# Patient Record
Sex: FEMALE | Race: WHITE | NOT HISPANIC OR LATINO | Employment: FULL TIME | URBAN - METROPOLITAN AREA
[De-identification: names, ages, dates, MRNs, and addresses within clinical notes are randomized per-mention and may not be internally consistent; named-entity substitution may affect disease eponyms.]

---

## 2023-11-09 ENCOUNTER — LAB (OUTPATIENT)
Dept: LAB | Facility: HOSPITAL | Age: 38
End: 2023-11-09
Payer: COMMERCIAL

## 2023-11-09 ENCOUNTER — OFFICE VISIT (OUTPATIENT)
Dept: FAMILY MEDICINE CLINIC | Age: 38
End: 2023-11-09
Payer: COMMERCIAL

## 2023-11-09 VITALS
HEIGHT: 58 IN | BODY MASS INDEX: 42.19 KG/M2 | SYSTOLIC BLOOD PRESSURE: 114 MMHG | WEIGHT: 201 LBS | HEART RATE: 96 BPM | DIASTOLIC BLOOD PRESSURE: 79 MMHG

## 2023-11-09 DIAGNOSIS — Z87.42 HISTORY OF ABNORMAL CERVICAL PAP SMEAR: ICD-10-CM

## 2023-11-09 DIAGNOSIS — Z00.00 ENCOUNTER FOR MEDICAL EXAMINATION TO ESTABLISH CARE: Primary | ICD-10-CM

## 2023-11-09 DIAGNOSIS — Z23 INFLUENZA VACCINE ADMINISTERED: ICD-10-CM

## 2023-11-09 DIAGNOSIS — E55.9 VITAMIN D DEFICIENCY: ICD-10-CM

## 2023-11-09 DIAGNOSIS — F41.9 ANXIETY AND DEPRESSION: ICD-10-CM

## 2023-11-09 DIAGNOSIS — F32.A ANXIETY AND DEPRESSION: ICD-10-CM

## 2023-11-09 DIAGNOSIS — E66.01 MORBID OBESITY WITH BMI OF 40.0-44.9, ADULT: ICD-10-CM

## 2023-11-09 DIAGNOSIS — K44.9 HIATAL HERNIA: ICD-10-CM

## 2023-11-09 DIAGNOSIS — F43.10 PTSD (POST-TRAUMATIC STRESS DISORDER): ICD-10-CM

## 2023-11-09 DIAGNOSIS — E78.2 MIXED HYPERLIPIDEMIA: ICD-10-CM

## 2023-11-09 DIAGNOSIS — K21.9 GASTROESOPHAGEAL REFLUX DISEASE, UNSPECIFIED WHETHER ESOPHAGITIS PRESENT: ICD-10-CM

## 2023-11-09 DIAGNOSIS — K58.0 IRRITABLE BOWEL SYNDROME WITH DIARRHEA: ICD-10-CM

## 2023-11-09 LAB
ALBUMIN SERPL-MCNC: 4.4 G/DL (ref 3.5–5.2)
ALBUMIN/GLOB SERPL: 1.4 G/DL
ALP SERPL-CCNC: 68 U/L (ref 39–117)
ALT SERPL W P-5'-P-CCNC: 29 U/L (ref 1–33)
ANION GAP SERPL CALCULATED.3IONS-SCNC: 11.1 MMOL/L (ref 5–15)
AST SERPL-CCNC: 20 U/L (ref 1–32)
BILIRUB SERPL-MCNC: 0.5 MG/DL (ref 0–1.2)
BUN SERPL-MCNC: 10 MG/DL (ref 6–20)
BUN/CREAT SERPL: 10.8 (ref 7–25)
CALCIUM SPEC-SCNC: 9.5 MG/DL (ref 8.6–10.5)
CHLORIDE SERPL-SCNC: 103 MMOL/L (ref 98–107)
CHOLEST SERPL-MCNC: 275 MG/DL (ref 0–200)
CO2 SERPL-SCNC: 24.9 MMOL/L (ref 22–29)
CREAT SERPL-MCNC: 0.93 MG/DL (ref 0.57–1)
EGFRCR SERPLBLD CKD-EPI 2021: 80.8 ML/MIN/1.73
GLOBULIN UR ELPH-MCNC: 3.1 GM/DL
GLUCOSE SERPL-MCNC: 118 MG/DL (ref 65–99)
HDLC SERPL-MCNC: 43 MG/DL (ref 40–60)
LDLC SERPL CALC-MCNC: 186 MG/DL (ref 0–100)
LDLC/HDLC SERPL: 4.28 {RATIO}
POTASSIUM SERPL-SCNC: 3.6 MMOL/L (ref 3.5–5.2)
PROT SERPL-MCNC: 7.5 G/DL (ref 6–8.5)
SODIUM SERPL-SCNC: 139 MMOL/L (ref 136–145)
TRIGL SERPL-MCNC: 239 MG/DL (ref 0–150)
VLDLC SERPL-MCNC: 46 MG/DL (ref 5–40)

## 2023-11-09 PROCEDURE — 36415 COLL VENOUS BLD VENIPUNCTURE: CPT

## 2023-11-09 PROCEDURE — 80053 COMPREHEN METABOLIC PANEL: CPT

## 2023-11-09 PROCEDURE — 80061 LIPID PANEL: CPT

## 2023-11-09 PROCEDURE — 82306 VITAMIN D 25 HYDROXY: CPT

## 2023-11-09 RX ORDER — DICYCLOMINE HYDROCHLORIDE 10 MG/1
10 CAPSULE ORAL 2 TIMES DAILY
COMMUNITY

## 2023-11-09 RX ORDER — SERTRALINE HYDROCHLORIDE 25 MG/1
25 TABLET, FILM COATED ORAL DAILY
Qty: 30 TABLET | Refills: 0 | Status: SHIPPED | OUTPATIENT
Start: 2023-11-09

## 2023-11-09 RX ORDER — ROSUVASTATIN CALCIUM 20 MG/1
20 TABLET, COATED ORAL DAILY
COMMUNITY
Start: 2023-07-31 | End: 2023-11-13

## 2023-11-09 RX ORDER — ERGOCALCIFEROL 1.25 MG/1
50000 CAPSULE ORAL
COMMUNITY
End: 2023-11-10 | Stop reason: SDUPTHER
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
## 2023-11-10 ENCOUNTER — PATIENT MESSAGE (OUTPATIENT)
Dept: FAMILY MEDICINE CLINIC | Age: 38
End: 2023-11-10

## 2023-11-10 LAB — 25(OH)D3 SERPL-MCNC: 30.7 NG/ML (ref 30–100)

## 2023-11-10 RX ORDER — ERGOCALCIFEROL 1.25 MG/1
50000 CAPSULE ORAL
Qty: 4 CAPSULE | Refills: 3 | Status: SHIPPED | OUTPATIENT
Start: 2023-11-10

## 2023-11-10 NOTE — ASSESSMENT & PLAN NOTE
Patient's (Body mass index is 42.01 kg/m².) indicates that they are morbidly/severely obese (BMI > 40 or > 35 with obesity - related health condition) with health conditions that include dyslipidemias and GERD . Weight is unchanged. BMI  is above average; BMI management plan is completed. We discussed portion control, increasing exercise, and consulting a Bariatric surgeon.

## 2023-11-10 NOTE — ASSESSMENT & PLAN NOTE
Not controlled, pt would like to start zoloft. Resuming benzo not recommended, will be referred to psych for further workup and management given pt reported possible hx of schizophrenia

## 2023-11-13 RX ORDER — ROSUVASTATIN CALCIUM 40 MG/1
40 TABLET, COATED ORAL NIGHTLY
Qty: 90 TABLET | Refills: 0 | Status: SHIPPED | OUTPATIENT
Start: 2023-11-13

## 2023-11-28 ENCOUNTER — TELEPHONE (OUTPATIENT)
Dept: FAMILY MEDICINE CLINIC | Age: 38
End: 2023-11-28
Payer: COMMERCIAL

## 2023-12-10 DIAGNOSIS — F32.A ANXIETY AND DEPRESSION: ICD-10-CM

## 2023-12-10 DIAGNOSIS — F41.9 ANXIETY AND DEPRESSION: ICD-10-CM

## 2023-12-11 ENCOUNTER — OFFICE VISIT (OUTPATIENT)
Dept: FAMILY MEDICINE CLINIC | Age: 38
End: 2023-12-11
Payer: COMMERCIAL

## 2023-12-11 VITALS
WEIGHT: 207.4 LBS | BODY MASS INDEX: 43.53 KG/M2 | SYSTOLIC BLOOD PRESSURE: 125 MMHG | HEIGHT: 58 IN | DIASTOLIC BLOOD PRESSURE: 80 MMHG | HEART RATE: 95 BPM

## 2023-12-11 DIAGNOSIS — Z01.419 WELL WOMAN EXAM WITH ROUTINE GYNECOLOGICAL EXAM: Primary | ICD-10-CM

## 2023-12-11 DIAGNOSIS — F32.A ANXIETY AND DEPRESSION: ICD-10-CM

## 2023-12-11 DIAGNOSIS — Z12.4 ENCOUNTER FOR PAPANICOLAOU SMEAR FOR CERVICAL CANCER SCREENING: ICD-10-CM

## 2023-12-11 DIAGNOSIS — R32 URINARY INCONTINENCE, UNSPECIFIED TYPE: ICD-10-CM

## 2023-12-11 DIAGNOSIS — F41.9 ANXIETY AND DEPRESSION: ICD-10-CM

## 2023-12-11 PROCEDURE — G0123 SCREEN CERV/VAG THIN LAYER: HCPCS | Performed by: NURSE PRACTITIONER

## 2023-12-11 PROCEDURE — 87624 HPV HI-RISK TYP POOLED RSLT: CPT | Performed by: NURSE PRACTITIONER

## 2023-12-11 RX ORDER — SERTRALINE HYDROCHLORIDE 25 MG/1
25 TABLET, FILM COATED ORAL DAILY
Qty: 30 TABLET | Refills: 0 | OUTPATIENT
Start: 2023-12-11

## 2023-12-11 RX ORDER — OXYBUTYNIN CHLORIDE 5 MG/1
5 TABLET, EXTENDED RELEASE ORAL DAILY
Qty: 90 TABLET | Refills: 0 | Status: SHIPPED | OUTPATIENT
Start: 2023-12-11

## 2023-12-11 NOTE — PROGRESS NOTES
"Emily Garcia presents to [facility] with complaint of  Gynecologic Exam    SUBJECTIVE  History of Present Illness    WELL WOMAN EXAM:  Her last well woman  exam was  8 year(s) ago.  Her last menstrual period was  1 week ago .  Her current method of contraception is  tubal .  Sexually active?  Spouse.    Last pap smear was done  8 yrs ago .  Her last pap smear was normal.   She performs self breast exams NEVER.  Denies any concerns with findings on exam.     Patient was also started on Zoloft at her last visit.  She does this medication has been somewhat helpful.  She does feel like the medication needs to be stronger.  She was also referred to psychiatry due to history of PTSD.  Patient was not able to be reached for appointment scheduling.    Patient is also wanting to discuss bladder leakage.  Patient has had 6 children and has been told in the past that her bladder leakage is related to this.  She says that her urinary leakage does not occur with sneezing, coughing, jumping laughing etc.  Patient says that she will be sitting at her desk at work and will stand up and will have a puddle of urine in her chair.  This has become very embarrassing and frustrating for the patient.  She is asking what can be done to help with this.        OBJECTIVE  Vital Signs:   /80 (BP Location: Left arm, Patient Position: Sitting)   Pulse 95   Ht 147.3 cm (58\")   Wt 94.1 kg (207 lb 6.4 oz)   BMI 43.35 kg/m²       Physical Exam  Vitals reviewed. Exam conducted with a chaperone present.   Constitutional:       General: She is not in acute distress.     Appearance: Normal appearance. She is not ill-appearing.   HENT:      Head: Normocephalic and atraumatic.      Nose: Nose normal.      Mouth/Throat:      Mouth: Mucous membranes are moist.      Pharynx: Oropharynx is clear.   Cardiovascular:      Rate and Rhythm: Normal rate and regular rhythm.      Pulses: Normal pulses.      Heart sounds: " Normal heart sounds.   Pulmonary:      Effort: Pulmonary effort is normal.      Breath sounds: Normal breath sounds.   Chest:      Chest wall: No mass or deformity.   Breasts:     Breasts are symmetrical.      Right: No swelling, bleeding, inverted nipple, mass, nipple discharge, skin change or tenderness.      Left: No swelling, bleeding, inverted nipple, mass, nipple discharge, skin change or tenderness.   Abdominal:      Hernia: There is no hernia in the left inguinal area or right inguinal area.   Genitourinary:     General: Normal vulva.      Exam position: Lithotomy position.      Pubic Area: No rash or pubic lice.       Labia:         Right: No rash, tenderness, lesion or injury.         Left: No rash, tenderness, lesion or injury.       Urethra: No prolapse, urethral pain, urethral swelling or urethral lesion.      Vagina: Normal.      Cervix: Cervical bleeding (Small amount of bleeding occurred when Pap taken) present.      Uterus: Normal.       Adnexa: Right adnexa normal and left adnexa normal.      Comments: Multiple benign-appearing cysts seen on cervix  Musculoskeletal:      Cervical back: Neck supple.   Lymphadenopathy:      Upper Body:      Right upper body: No supraclavicular, axillary or pectoral adenopathy.      Left upper body: No supraclavicular, axillary or pectoral adenopathy.      Lower Body: No right inguinal adenopathy. No left inguinal adenopathy.   Skin:     General: Skin is warm and dry.   Neurological:      General: No focal deficit present.      Mental Status: She is alert and oriented to person, place, and time. Mental status is at baseline.   Psychiatric:         Mood and Affect: Mood normal.         Behavior: Behavior normal.         Judgment: Judgment normal.              ASSESSMENT AND PLAN:  Diagnoses and all orders for this visit:    1. Well woman exam with routine gynecological exam (Primary)  -     IgP, Aptima HPV; Future  -     IgP, Aptima HPV    2. Encounter for Papanicolaou  smear for cervical cancer screening  -     IgP, Aptima HPV; Future  -     IgP, Aptima HPV    3. Anxiety and depression  -     sertraline (Zoloft) 50 MG tablet; Take 1 tablet by mouth Daily.  Dispense: 90 tablet; Refill: 0    4. Urinary incontinence, unspecified type  -     oxybutynin XL (Ditropan XL) 5 MG 24 hr tablet; Take 1 tablet by mouth Daily.  Dispense: 90 tablet; Refill: 0  -     Ambulatory Referral to Urology        Discussed injury prevention, diet and exercise, safe sexual practices, and screening for common diseases. Encouraged use of sunscreen and seatbelts. Discussed timing of  cervical cancer screening. Encouraged monthly self-breast exams, yearly clinical breast exams. Avoidance of tobacco encouraged. Limitation or avoidance of alcohol encouraged. Recommend yearly dental and eye exams. Also discussed monitoring of blood pressure, lipids.    Increase Zoloft to 50 mg daily.  She was given phone number to call and schedule appoint with psychiatry.  Her urinary incontinence does not sound typical of stress incontinence.  For this reason she will be referred to urology.  Also discussed that pelvic floor physical therapy may be beneficial.  Will trial Ditropan for now.    Follow Up   No follow-ups on file. Patient to notify office with any acute concerns or issues.  Patient verbalizes understanding, agrees with plan of care and has no further questions upon discharge.     Patient was given instructions and counseling regarding her condition or for health maintenance advice. Please see specific information pulled into the AVS if appropriate.     Discussed the importance of following up with any needed screening tests/labs/specialist appointments and any requested follow-up recommended by me today. Importance of maintaining follow-up discussed and patient accepts that missed appointments can delay diagnosis and potentially lead to worsening of conditions.    Part of this note may be an electronic  transcription/translation of spoken language to printed text using the Dragon Dictation System.

## 2023-12-14 ENCOUNTER — PATIENT MESSAGE (OUTPATIENT)
Dept: FAMILY MEDICINE CLINIC | Age: 38
End: 2023-12-14
Payer: COMMERCIAL

## 2023-12-14 DIAGNOSIS — L91.8 MULTIPLE ACQUIRED SKIN TAGS: Primary | ICD-10-CM

## 2023-12-14 NOTE — TELEPHONE ENCOUNTER
From: Emily Garcia  To: Neli Barth  Sent: 12/14/2023 3:35 AM EST  Subject: Skin tags     I forgot before I left the office Monday we was talking about going to see a doctor about my skin tags but I wasn’t sure if you sent anything over for me to see that doctor or not

## 2023-12-21 ENCOUNTER — PATIENT MESSAGE (OUTPATIENT)
Dept: FAMILY MEDICINE CLINIC | Age: 38
End: 2023-12-21

## 2024-03-04 DIAGNOSIS — F41.9 ANXIETY AND DEPRESSION: ICD-10-CM

## 2024-03-04 DIAGNOSIS — F32.A ANXIETY AND DEPRESSION: ICD-10-CM

## 2024-03-04 DIAGNOSIS — R32 URINARY INCONTINENCE, UNSPECIFIED TYPE: ICD-10-CM

## 2024-03-04 RX ORDER — OXYBUTYNIN CHLORIDE 5 MG/1
5 TABLET, EXTENDED RELEASE ORAL DAILY
Qty: 90 TABLET | Refills: 0 | Status: SHIPPED | OUTPATIENT
Start: 2024-03-04

## 2024-03-15 ENCOUNTER — TELEPHONE (OUTPATIENT)
Dept: FAMILY MEDICINE CLINIC | Age: 39
End: 2024-03-15
Payer: COMMERCIAL

## 2024-05-02 RX ORDER — ROSUVASTATIN CALCIUM 40 MG/1
40 TABLET, COATED ORAL NIGHTLY
Qty: 30 TABLET | Refills: 0 | Status: SHIPPED | OUTPATIENT
Start: 2024-05-02

## 2024-07-06 DIAGNOSIS — F32.A ANXIETY AND DEPRESSION: ICD-10-CM

## 2024-07-06 DIAGNOSIS — F41.9 ANXIETY AND DEPRESSION: ICD-10-CM

## 2024-07-12 DIAGNOSIS — F32.A ANXIETY AND DEPRESSION: ICD-10-CM

## 2024-07-12 DIAGNOSIS — F41.9 ANXIETY AND DEPRESSION: ICD-10-CM

## 2024-07-14 DIAGNOSIS — R32 URINARY INCONTINENCE, UNSPECIFIED TYPE: ICD-10-CM

## 2024-07-16 DIAGNOSIS — F41.9 ANXIETY AND DEPRESSION: ICD-10-CM

## 2024-07-16 DIAGNOSIS — F32.A ANXIETY AND DEPRESSION: ICD-10-CM

## 2024-07-16 RX ORDER — OXYBUTYNIN CHLORIDE 5 MG/1
5 TABLET, EXTENDED RELEASE ORAL DAILY
Qty: 90 TABLET | Refills: 0 | OUTPATIENT
Start: 2024-07-16

## 2024-07-20 DIAGNOSIS — F41.9 ANXIETY AND DEPRESSION: ICD-10-CM

## 2024-07-20 DIAGNOSIS — F32.A ANXIETY AND DEPRESSION: ICD-10-CM

## 2024-08-20 ENCOUNTER — LAB (OUTPATIENT)
Dept: LAB | Facility: HOSPITAL | Age: 39
End: 2024-08-20
Payer: COMMERCIAL

## 2024-08-20 ENCOUNTER — OFFICE VISIT (OUTPATIENT)
Dept: FAMILY MEDICINE CLINIC | Age: 39
End: 2024-08-20
Payer: COMMERCIAL

## 2024-08-20 VITALS
DIASTOLIC BLOOD PRESSURE: 86 MMHG | HEART RATE: 72 BPM | SYSTOLIC BLOOD PRESSURE: 123 MMHG | TEMPERATURE: 98.2 F | BODY MASS INDEX: 41.98 KG/M2 | WEIGHT: 200 LBS | HEIGHT: 58 IN

## 2024-08-20 DIAGNOSIS — J06.9 VIRAL URI: Primary | ICD-10-CM

## 2024-08-20 DIAGNOSIS — E78.2 MIXED HYPERLIPIDEMIA: ICD-10-CM

## 2024-08-20 DIAGNOSIS — R63.1 INCREASED THIRST: ICD-10-CM

## 2024-08-20 DIAGNOSIS — R35.0 INCREASED FREQUENCY OF URINATION: ICD-10-CM

## 2024-08-20 LAB
25(OH)D3 SERPL-MCNC: 24.1 NG/ML (ref 30–100)
ALBUMIN SERPL-MCNC: 4.2 G/DL (ref 3.5–5.2)
ALBUMIN/GLOB SERPL: 1.2 G/DL
ALP SERPL-CCNC: 78 U/L (ref 39–117)
ALT SERPL W P-5'-P-CCNC: 19 U/L (ref 1–33)
ANION GAP SERPL CALCULATED.3IONS-SCNC: 9.8 MMOL/L (ref 5–15)
AST SERPL-CCNC: 18 U/L (ref 1–32)
BASOPHILS # BLD AUTO: 0.01 10*3/MM3 (ref 0–0.2)
BASOPHILS NFR BLD AUTO: 0.1 % (ref 0–1.5)
BILIRUB SERPL-MCNC: 0.5 MG/DL (ref 0–1.2)
BUN SERPL-MCNC: 9 MG/DL (ref 6–20)
BUN/CREAT SERPL: 10.5 (ref 7–25)
CALCIUM SPEC-SCNC: 9.4 MG/DL (ref 8.6–10.5)
CHLORIDE SERPL-SCNC: 102 MMOL/L (ref 98–107)
CHOLEST SERPL-MCNC: 232 MG/DL (ref 0–200)
CO2 SERPL-SCNC: 26.2 MMOL/L (ref 22–29)
CREAT SERPL-MCNC: 0.86 MG/DL (ref 0.57–1)
DEPRECATED RDW RBC AUTO: 39.6 FL (ref 37–54)
EGFRCR SERPLBLD CKD-EPI 2021: 88.3 ML/MIN/1.73
EOSINOPHIL # BLD AUTO: 0.34 10*3/MM3 (ref 0–0.4)
EOSINOPHIL NFR BLD AUTO: 4.1 % (ref 0.3–6.2)
ERYTHROCYTE [DISTWIDTH] IN BLOOD BY AUTOMATED COUNT: 12.8 % (ref 12.3–15.4)
EXPIRATION DATE: NORMAL
FLUAV AG UPPER RESP QL IA.RAPID: NOT DETECTED
FLUBV AG UPPER RESP QL IA.RAPID: NOT DETECTED
GLOBULIN UR ELPH-MCNC: 3.4 GM/DL
GLUCOSE SERPL-MCNC: 109 MG/DL (ref 65–99)
HBA1C MFR BLD: 5.8 % (ref 4.8–5.6)
HCT VFR BLD AUTO: 40.8 % (ref 34–46.6)
HDLC SERPL-MCNC: 37 MG/DL (ref 40–60)
HGB BLD-MCNC: 13.2 G/DL (ref 12–15.9)
IMM GRANULOCYTES # BLD AUTO: 0.05 10*3/MM3 (ref 0–0.05)
IMM GRANULOCYTES NFR BLD AUTO: 0.6 % (ref 0–0.5)
INTERNAL CONTROL: NORMAL
LDLC SERPL CALC-MCNC: 164 MG/DL (ref 0–100)
LDLC/HDLC SERPL: 4.36 {RATIO}
LYMPHOCYTES # BLD AUTO: 3.05 10*3/MM3 (ref 0.7–3.1)
LYMPHOCYTES NFR BLD AUTO: 37.1 % (ref 19.6–45.3)
Lab: NORMAL
MCH RBC QN AUTO: 27.5 PG (ref 26.6–33)
MCHC RBC AUTO-ENTMCNC: 32.4 G/DL (ref 31.5–35.7)
MCV RBC AUTO: 85 FL (ref 79–97)
MONOCYTES # BLD AUTO: 0.51 10*3/MM3 (ref 0.1–0.9)
MONOCYTES NFR BLD AUTO: 6.2 % (ref 5–12)
NEUTROPHILS NFR BLD AUTO: 4.25 10*3/MM3 (ref 1.7–7)
NEUTROPHILS NFR BLD AUTO: 51.9 % (ref 42.7–76)
PLATELET # BLD AUTO: 349 10*3/MM3 (ref 140–450)
PMV BLD AUTO: 8.7 FL (ref 6–12)
POTASSIUM SERPL-SCNC: 4.1 MMOL/L (ref 3.5–5.2)
PROT SERPL-MCNC: 7.6 G/DL (ref 6–8.5)
RBC # BLD AUTO: 4.8 10*6/MM3 (ref 3.77–5.28)
SARS-COV-2 AG UPPER RESP QL IA.RAPID: NOT DETECTED
SODIUM SERPL-SCNC: 138 MMOL/L (ref 136–145)
TRIGL SERPL-MCNC: 168 MG/DL (ref 0–150)
TSH SERPL DL<=0.05 MIU/L-ACNC: 1.82 UIU/ML (ref 0.27–4.2)
VLDLC SERPL-MCNC: 31 MG/DL (ref 5–40)
WBC NRBC COR # BLD AUTO: 8.21 10*3/MM3 (ref 3.4–10.8)

## 2024-08-20 PROCEDURE — 1160F RVW MEDS BY RX/DR IN RCRD: CPT | Performed by: NURSE PRACTITIONER

## 2024-08-20 PROCEDURE — 83036 HEMOGLOBIN GLYCOSYLATED A1C: CPT

## 2024-08-20 PROCEDURE — 80061 LIPID PANEL: CPT

## 2024-08-20 PROCEDURE — 99214 OFFICE O/P EST MOD 30 MIN: CPT | Performed by: NURSE PRACTITIONER

## 2024-08-20 PROCEDURE — 80053 COMPREHEN METABOLIC PANEL: CPT

## 2024-08-20 PROCEDURE — 85025 COMPLETE CBC W/AUTO DIFF WBC: CPT

## 2024-08-20 PROCEDURE — 1159F MED LIST DOCD IN RCRD: CPT | Performed by: NURSE PRACTITIONER

## 2024-08-20 PROCEDURE — 84443 ASSAY THYROID STIM HORMONE: CPT

## 2024-08-20 PROCEDURE — 87428 SARSCOV & INF VIR A&B AG IA: CPT | Performed by: NURSE PRACTITIONER

## 2024-08-20 PROCEDURE — 82306 VITAMIN D 25 HYDROXY: CPT

## 2024-08-20 PROCEDURE — 36415 COLL VENOUS BLD VENIPUNCTURE: CPT

## 2024-08-20 RX ORDER — IBUPROFEN 600 MG/1
600 TABLET ORAL EVERY 6 HOURS PRN
Qty: 30 TABLET | Refills: 0 | Status: SHIPPED | OUTPATIENT
Start: 2024-08-20

## 2024-08-20 NOTE — PROGRESS NOTES
"Chief Complaint  Dizziness (Lightheaded, elevated blood sugar)    Subjective      Emily Garcia is a 39 year old female that presents to [facility] with c/o dizziness/lightheadedness, sweats and elevated blood sugar. She checked her blood sugar with her husbands meter and had a glucose of 180. She also c/o dry lips and increased thirst and urination. States she is not diabetic. Symptoms have been going on for several weeks.   She states that she has been having headaches and sore throat as well that started 1-2 days ago. She admits to fever, body aches and chills. Reports known exposure to covid at work in the last several days.      History of Present Illness    Current Outpatient Medications   Medication Instructions    dicyclomine (BENTYL) 10 mg, Oral, 2 Times Daily    ibuprofen (ADVIL,MOTRIN) 600 mg, Oral, Every 6 Hours PRN    oxybutynin XL (DITROPAN-XL) 5 mg, Oral, Daily    rosuvastatin (CRESTOR) 40 mg, Oral, Nightly, MUST COMPLETE LABS AND HAVE OFFICE VISIT FOR FURTHER REFILLS    sertraline (ZOLOFT) 50 mg, Oral, Daily    vitamin D (ERGOCALCIFEROL) 50,000 Units, Oral, Every 7 Days       The following portions of the patient's history were reviewed and updated as appropriate: allergies, current medications, past family history, past medical history, past social history, past surgical history, and problem list.    Objective   Vital Signs:   /86 (BP Location: Left arm, Patient Position: Sitting)   Pulse 72   Temp 98.2 °F (36.8 °C) (Oral)   Ht 147.3 cm (58\")   Wt 90.7 kg (200 lb)   BMI 41.80 kg/m²     Wt Readings from Last 3 Encounters:   08/20/24 90.7 kg (200 lb)   12/11/23 94.1 kg (207 lb 6.4 oz)   11/09/23 91.2 kg (201 lb)     BP Readings from Last 3 Encounters:   08/20/24 123/86   12/11/23 125/80   11/09/23 114/79     Physical Exam  Vitals and nursing note reviewed.   Constitutional:       Appearance: Normal appearance. She is obese. She is not ill-appearing.   HENT: "      Head: Normocephalic and atraumatic.      Right Ear: Tympanic membrane, ear canal and external ear normal.      Left Ear: Tympanic membrane, ear canal and external ear normal.      Nose: Nose normal.      Mouth/Throat:      Mouth: Mucous membranes are moist.      Pharynx: No posterior oropharyngeal erythema.   Eyes:      Conjunctiva/sclera: Conjunctivae normal.      Pupils: Pupils are equal, round, and reactive to light.   Cardiovascular:      Rate and Rhythm: Normal rate and regular rhythm.      Heart sounds: Normal heart sounds.   Pulmonary:      Effort: Pulmonary effort is normal.      Breath sounds: Normal breath sounds.   Skin:     General: Skin is warm and dry.      Capillary Refill: Capillary refill takes less than 2 seconds.   Neurological:      Mental Status: She is alert and oriented to person, place, and time.   Psychiatric:         Mood and Affect: Mood normal.         Behavior: Behavior normal.          Result Review :  The following data was reviewed by: DILCIA Merino on 08/20/2024:      Common labs          11/9/2023    12:40 8/20/2024    08:47   Common Labs   Glucose 118     BUN 10     Creatinine 0.93     Sodium 139     Potassium 3.6     Chloride 103     Calcium 9.5     Albumin 4.4     Total Bilirubin 0.5     Alkaline Phosphatase 68     AST (SGOT) 20     ALT (SGPT) 29     WBC  8.21    Hemoglobin  13.2    Hematocrit  40.8    Platelets  349    Total Cholesterol 275     Triglycerides 239     HDL Cholesterol 43     LDL Cholesterol  186         Lab Results (last 72 hours)       Procedure Component Value Units Date/Time    CBC w AUTO Differential [[id number]]  (Abnormal) Collected: 08/20/24 0847    Specimen: Blood Updated: 08/20/24 0904    Narrative:      The following orders were created for panel order CBC w AUTO Differential.  Procedure                               Abnormality         Status                     ---------                               -----------         ------                      CBC Auto Differential[[id number]]        Abnormal            Final result                 Please view results for these tests on the individual orders.    Comprehensive Metabolic Panel [[id number]] Collected: 08/20/24 0847    Specimen: Blood Updated: 08/20/24 0847    Lipid Panel [[id number]] Collected: 08/20/24 0847    Specimen: Blood Updated: 08/20/24 0847    Hemoglobin A1c [[id number]] Collected: 08/20/24 0847    Specimen: Blood Updated: 08/20/24 0847    TSH Rfx On Abnormal To Free T4 [[id number]] Collected: 08/20/24 0847    Specimen: Blood Updated: 08/20/24 0847    Vitamin D 25 hydroxy [[id number]] Collected: 08/20/24 0847    Specimen: Blood Updated: 08/20/24 0847    CBC Auto Differential [[id number]]  (Abnormal) Collected: 08/20/24 0847    Specimen: Blood Updated: 08/20/24 0904     WBC 8.21 10*3/mm3      RBC 4.80 10*6/mm3      Hemoglobin 13.2 g/dL      Hematocrit 40.8 %      MCV 85.0 fL      MCH 27.5 pg      MCHC 32.4 g/dL      RDW 12.8 %      RDW-SD 39.6 fl      MPV 8.7 fL      Platelets 349 10*3/mm3      Neutrophil % 51.9 %      Lymphocyte % 37.1 %      Monocyte % 6.2 %      Eosinophil % 4.1 %      Basophil % 0.1 %      Immature Grans % 0.6 %      Neutrophils, Absolute 4.25 10*3/mm3      Lymphocytes, Absolute 3.05 10*3/mm3      Monocytes, Absolute 0.51 10*3/mm3      Eosinophils, Absolute 0.34 10*3/mm3      Basophils, Absolute 0.01 10*3/mm3      Immature Grans, Absolute 0.05 10*3/mm3     POCT SARS-CoV-2 Antigen TENZIN + Flu [[id number]] Collected: 08/20/24 0856    Specimen: Swab Updated: 08/20/24 0856     SARS Antigen Not Detected     Influenza A Antigen TENZIN Not Detected     Influenza B Antigen TENZIN Not Detected     Internal Control Passed     Lot Number [id number]     Expiration Date 11-2-24             No Images in the past 120 days found..    Lab Results   Component Value Date    SARSANTIGEN Not Detected 08/20/2024    COVID19 Not Detected 01/04/2021    COVID19 Acceptable 01/04/2021    RAPFLUA Not Detected  01/04/2021    RAPFLUB Not Detected 01/04/2021    FLUAAG Not Detected 08/20/2024    FLUBAG Not Detected 08/20/2024    RAPSCRN Negative 01/04/2021    BILIRUBINUR Negative 07/14/2018       Procedures        Assessment and Plan   Diagnoses and all orders for this visit:    1. Viral URI (Primary)  -     POCT SARS-CoV-2 Antigen TENZIN + Flu  -     ibuprofen (ADVIL,MOTRIN) 600 MG tablet; Take 1 tablet by mouth Every 6 (Six) Hours As Needed for Moderate Pain.  Dispense: 30 tablet; Refill: 0    2. Increased thirst  -     CBC w AUTO Differential; Future  -     Comprehensive metabolic panel; Future  -     Hemoglobin A1c; Future  -     TSH Rfx On Abnormal To Free T4; Future  -     Lipid panel; Future  -     Vitamin D 25 hydroxy; Future    3. Increased frequency of urination  -     CBC w AUTO Differential; Future  -     Comprehensive metabolic panel; Future  -     Hemoglobin A1c; Future  -     TSH Rfx On Abnormal To Free T4; Future  -     Lipid panel; Future  -     Vitamin D 25 hydroxy; Future                  There are no discontinued medications.       Follow Up   No follow-ups on file.  Patient was given instructions and counseling regarding her condition or for health maintenance advice. Please see specific information pulled into the AVS if appropriate.       Rosa Madrid, DILCIA  08/20/24  09:52 EDT

## 2024-08-20 NOTE — PATIENT INSTRUCTIONS
Supportive care with rest, plenty of fluids, tylenol/ibuprofen for pain and/or fever as needed per label instructions.  You may find a cool-mist humidifier helpful as well as throat lozenges, Chloraseptic spray and warm salt water gargles.  Should you develop shortness of breath, chest pain or worsening of symptoms please go to the ER.

## 2024-08-22 ENCOUNTER — TELEPHONE (OUTPATIENT)
Dept: FAMILY MEDICINE CLINIC | Age: 39
End: 2024-08-22
Payer: COMMERCIAL

## 2024-08-22 ENCOUNTER — PATIENT MESSAGE (OUTPATIENT)
Dept: FAMILY MEDICINE CLINIC | Age: 39
End: 2024-08-22
Payer: COMMERCIAL

## 2024-08-22 RX ORDER — ERGOCALCIFEROL 1.25 MG/1
50000 CAPSULE ORAL
Qty: 4 CAPSULE | Refills: 3 | Status: SHIPPED | OUTPATIENT
Start: 2024-08-22

## 2024-08-22 RX ORDER — ROSUVASTATIN CALCIUM 40 MG/1
40 TABLET, COATED ORAL NIGHTLY
Qty: 90 TABLET | Refills: 0 | Status: SHIPPED | OUTPATIENT
Start: 2024-08-22

## 2024-08-22 NOTE — TELEPHONE ENCOUNTER
Caller: Emily Garcia    Relationship: Self    Best call back number: [phone]    Caller requesting test results: PATIENT    What test was performed: BLOOD LABS    When was the test performed: 08/13/2024    Where was the test performed: IN OFFICE    Additional notes: PATIENT HAS HAD LABS DRAWN ON TUES 08/13/2024. SHE HAS NOT RECEIVED A CALL TO GO OVER HER RESULTS. PLEASE CALL PATIENT BACK AND ADVISE.

## 2024-10-19 DIAGNOSIS — F41.9 ANXIETY AND DEPRESSION: ICD-10-CM

## 2024-10-19 DIAGNOSIS — F32.A ANXIETY AND DEPRESSION: ICD-10-CM

## 2024-10-24 ENCOUNTER — OFFICE VISIT (OUTPATIENT)
Dept: FAMILY MEDICINE CLINIC | Age: 39
End: 2024-10-24
Payer: COMMERCIAL

## 2024-10-24 ENCOUNTER — LAB (OUTPATIENT)
Dept: LAB | Facility: HOSPITAL | Age: 39
End: 2024-10-24
Payer: COMMERCIAL

## 2024-10-24 VITALS
WEIGHT: 204.2 LBS | SYSTOLIC BLOOD PRESSURE: 115 MMHG | HEIGHT: 58 IN | OXYGEN SATURATION: 100 % | BODY MASS INDEX: 42.86 KG/M2 | HEART RATE: 86 BPM | DIASTOLIC BLOOD PRESSURE: 85 MMHG | TEMPERATURE: 98.5 F

## 2024-10-24 DIAGNOSIS — R06.83 SNORING: ICD-10-CM

## 2024-10-24 DIAGNOSIS — R53.83 OTHER FATIGUE: ICD-10-CM

## 2024-10-24 DIAGNOSIS — E55.9 VITAMIN D DEFICIENCY: ICD-10-CM

## 2024-10-24 DIAGNOSIS — R73.9 ELEVATED BLOOD SUGAR: ICD-10-CM

## 2024-10-24 DIAGNOSIS — R73.9 ELEVATED BLOOD SUGAR: Primary | ICD-10-CM

## 2024-10-24 LAB
25(OH)D3 SERPL-MCNC: 31.8 NG/ML (ref 30–100)
ALBUMIN SERPL-MCNC: 4.2 G/DL (ref 3.5–5.2)
ALBUMIN/GLOB SERPL: 1.2 G/DL
ALP SERPL-CCNC: 75 U/L (ref 39–117)
ALT SERPL W P-5'-P-CCNC: 23 U/L (ref 1–33)
ANION GAP SERPL CALCULATED.3IONS-SCNC: 10.1 MMOL/L (ref 5–15)
AST SERPL-CCNC: 18 U/L (ref 1–32)
BILIRUB SERPL-MCNC: 0.4 MG/DL (ref 0–1.2)
BUN SERPL-MCNC: 12 MG/DL (ref 6–20)
BUN/CREAT SERPL: 14.1 (ref 7–25)
CALCIUM SPEC-SCNC: 9.2 MG/DL (ref 8.6–10.5)
CHLORIDE SERPL-SCNC: 103 MMOL/L (ref 98–107)
CO2 SERPL-SCNC: 26.9 MMOL/L (ref 22–29)
CREAT SERPL-MCNC: 0.85 MG/DL (ref 0.57–1)
EGFRCR SERPLBLD CKD-EPI 2021: 89.5 ML/MIN/1.73
GLOBULIN UR ELPH-MCNC: 3.5 GM/DL
GLUCOSE SERPL-MCNC: 107 MG/DL (ref 65–99)
HBA1C MFR BLD: 5.5 % (ref 4.8–5.6)
HETEROPH AB SER QL LA: NEGATIVE
POTASSIUM SERPL-SCNC: 3.8 MMOL/L (ref 3.5–5.2)
PROT SERPL-MCNC: 7.7 G/DL (ref 6–8.5)
SODIUM SERPL-SCNC: 140 MMOL/L (ref 136–145)
TSH SERPL DL<=0.05 MIU/L-ACNC: 2.42 UIU/ML (ref 0.27–4.2)
VIT B12 BLD-MCNC: 334 PG/ML (ref 211–946)

## 2024-10-24 PROCEDURE — 84443 ASSAY THYROID STIM HORMONE: CPT

## 2024-10-24 PROCEDURE — 1159F MED LIST DOCD IN RCRD: CPT | Performed by: NURSE PRACTITIONER

## 2024-10-24 PROCEDURE — 1160F RVW MEDS BY RX/DR IN RCRD: CPT | Performed by: NURSE PRACTITIONER

## 2024-10-24 PROCEDURE — 99214 OFFICE O/P EST MOD 30 MIN: CPT | Performed by: NURSE PRACTITIONER

## 2024-10-24 PROCEDURE — 80053 COMPREHEN METABOLIC PANEL: CPT

## 2024-10-24 PROCEDURE — 36415 COLL VENOUS BLD VENIPUNCTURE: CPT

## 2024-10-24 PROCEDURE — 82607 VITAMIN B-12: CPT

## 2024-10-24 PROCEDURE — 86308 HETEROPHILE ANTIBODY SCREEN: CPT

## 2024-10-24 PROCEDURE — 83036 HEMOGLOBIN GLYCOSYLATED A1C: CPT

## 2024-10-24 PROCEDURE — 82306 VITAMIN D 25 HYDROXY: CPT

## 2024-10-24 NOTE — PROGRESS NOTES
Chief Complaint  Emily Garcia presents to [facility] for Fatigue (Constantly falling asleep X 1 month/Discuss high sugar levels, dizziness )    Subjective          History of Present Illness    Emily is here today with c/o fatigue. She reports that symptoms have been going on for the past couple of months. She reports that she typically goes to bed around 9 pm. She wakes up at 6 am. She does have some trouble falling asleep at times. She will sometimes wake up during the night and having trouble falling back to sleep. This is on the nights that she is not working. She works 7 pm to 7 am 3 nights per week. Notes she was having fatigue prior to starting this job. She does snore at night. She has not previously had a sleep study. She had normal TSH on 8/20/24. She follows with [facility] for hx partial thyroidectomy. She has appt next week. She notes some dizziness. Has checked her blood sugar at times and it has been in the 180s. She has never been on medication in the past. Her A1c was 5.8 on last lab check. Vitamin D was low. She has been taking Vitamin D3 supplement as ordered.    Review of Systems      Allergies   Allergen Reactions    Acetaminophen-Codeine Hives      Past Medical History:   Diagnosis Date    History of abnormal cervical Pap smear     Hyperlipidemia     IBS (irritable bowel syndrome)     PTSD (post-traumatic stress disorder)      Current Outpatient Medications   Medication Sig Dispense Refill    ibuprofen (ADVIL,MOTRIN) 600 MG tablet Take 1 tablet by mouth Every 6 (Six) Hours As Needed for Moderate Pain. 30 tablet 0    oxybutynin XL (DITROPAN-XL) 5 MG 24 hr tablet TAKE 1 TABLET BY MOUTH DAILY 90 tablet 0    rosuvastatin (CRESTOR) 40 MG tablet Take 1 tablet by mouth Every Night. MUST COMPLETE LABS AND HAVE OFFICE VISIT FOR FURTHER REFILLS 90 tablet 0    vitamin D (ERGOCALCIFEROL) 1.25 MG (02458 UT) capsule capsule Take 1 capsule by mouth Every 7  "(Seven) Days. 4 capsule 3     No current facility-administered medications for this visit.     Past Surgical History:   Procedure Laterality Date    CHOLECYSTECTOMY      LEEP      THYROIDECTOMY, PARTIAL Left     TUBAL ABDOMINAL LIGATION        Social History     Tobacco Use    Smoking status: Never     Passive exposure: Never    Smokeless tobacco: Never   Vaping Use    Vaping status: Never Used   Substance Use Topics    Alcohol use: Never    Drug use: Never     Family History   Problem Relation Age of Onset    Heart attack Maternal Grandmother     Hypertension Paternal Grandmother     Hyperlipidemia Paternal Grandmother     Heart disease Paternal Grandmother     Diabetes Paternal Grandmother     Heart attack Paternal Grandmother     Glaucoma Paternal Grandmother      Health Maintenance Due   Topic Date Due    ANNUAL PHYSICAL  Never done      Immunization History   Administered Date(s) Administered    Fluzone (or Fluarix & Flulaval for VFC) >6mos 10/08/2015, 02/28/2020, 10/08/2020, 11/09/2023    Influenza Injectable Mdck Pf Quad 10/21/2018    Influenza Seasonal Injectable 10/26/2007, 11/17/2008    Influenza Split Preservative Free ID 12/06/2017    Tdap 06/17/2014, 10/08/2015, 03/31/2017, 07/08/2023        Objective     Vitals:    10/24/24 1146   BP: 115/85   Pulse: 86   Temp: 98.5 °F (36.9 °C)   TempSrc: Oral   SpO2: 100%   Weight: 92.6 kg (204 lb 3.2 oz)   Height: 147.3 cm (58\")     Body mass index is 42.68 kg/m².              No results found.    Physical Exam  Vitals reviewed.   Constitutional:       General: She is not in acute distress.     Appearance: Normal appearance. She is well-developed.   HENT:      Head: Normocephalic and atraumatic.   Cardiovascular:      Rate and Rhythm: Normal rate.   Pulmonary:      Effort: Pulmonary effort is normal.   Neurological:      Mental Status: She is alert and oriented to person, place, and time.   Psychiatric:         Mood and Affect: Mood and affect normal. "           Result Review :                               Assessment and Plan      Assessment & Plan  Elevated blood sugar  Rechecking A1C. Consider starting metformin for prediabetes. Will also get her set up with dietician.   Vitamin D deficiency  Rechecking Vitamin D level  Other fatigue  Checking labs again. Sleep hygiene discussed. Will get her set up with sleep specialist.   Snoring  As above.     Orders Placed This Encounter   Procedures    Hemoglobin A1c    Vitamin D 25 hydroxy    TSH Rfx On Abnormal To Free T4    Comprehensive metabolic panel    Vitamin B12    Mononucleosis Screen    Ambulatory Referral to Nutrition Services    Ambulatory Referral to Sleep Medicine                  Follow Up     Return for Next scheduled follow up, with PCP, Or sooner as needed.

## 2024-11-18 ENCOUNTER — TELEPHONE (OUTPATIENT)
Dept: FAMILY MEDICINE CLINIC | Age: 39
End: 2024-11-18
Payer: COMMERCIAL

## 2024-11-18 NOTE — TELEPHONE ENCOUNTER
Caller: Emily Garcia    Relationship: Self    Best call back number: [phone]     What form or medical record are you requesting: DOCUMENTS FROM [employer] FOR TRANSPORTATION TO APPOINTMENT SCHEDULED ON 11.21.2024     Who is requesting this form or medical record from you: [employer]    How would you like to receive the form or medical records (pick-up, mail, fax): FAX NUMBER SHOULD BE DOCUMENTS SENT BY [employer]    Timeframe paperwork needed: 11.20.24    Additional notes: PLEASE ADVISE PATIENT ONCE THIS IS DONE.

## 2024-11-19 NOTE — TELEPHONE ENCOUNTER
Spoke with Dr Eugenio Waddell, pt is having initial eval for lab band. Paperwork signed and faxed.

## 2025-01-24 ENCOUNTER — TELEPHONE (OUTPATIENT)
Dept: FAMILY MEDICINE CLINIC | Age: 40
End: 2025-01-24
Payer: COMMERCIAL

## 2025-01-24 NOTE — TELEPHONE ENCOUNTER
Called pt regarding late cancel, she did reschedule and was made aware of the late cancel policy- 01/23/25 AB

## 2025-01-31 ENCOUNTER — OFFICE VISIT (OUTPATIENT)
Dept: FAMILY MEDICINE CLINIC | Age: 40
End: 2025-01-31
Payer: COMMERCIAL

## 2025-01-31 VITALS
HEART RATE: 73 BPM | SYSTOLIC BLOOD PRESSURE: 113 MMHG | BODY MASS INDEX: 44.42 KG/M2 | HEIGHT: 58 IN | DIASTOLIC BLOOD PRESSURE: 68 MMHG | WEIGHT: 211.6 LBS | OXYGEN SATURATION: 94 % | TEMPERATURE: 97.9 F

## 2025-01-31 DIAGNOSIS — F41.9 ANXIETY AND DEPRESSION: Primary | ICD-10-CM

## 2025-01-31 DIAGNOSIS — E55.9 VITAMIN D DEFICIENCY: ICD-10-CM

## 2025-01-31 DIAGNOSIS — F32.A ANXIETY AND DEPRESSION: Primary | ICD-10-CM

## 2025-01-31 DIAGNOSIS — F43.10 PTSD (POST-TRAUMATIC STRESS DISORDER): ICD-10-CM

## 2025-01-31 DIAGNOSIS — R32 URINARY INCONTINENCE, UNSPECIFIED TYPE: ICD-10-CM

## 2025-01-31 DIAGNOSIS — R06.83 SNORING: ICD-10-CM

## 2025-01-31 DIAGNOSIS — R53.83 OTHER FATIGUE: ICD-10-CM

## 2025-01-31 DIAGNOSIS — E78.2 MIXED HYPERLIPIDEMIA: ICD-10-CM

## 2025-01-31 DIAGNOSIS — E66.01 MORBID OBESITY WITH BMI OF 40.0-44.9, ADULT: ICD-10-CM

## 2025-01-31 PROCEDURE — 99214 OFFICE O/P EST MOD 30 MIN: CPT | Performed by: NURSE PRACTITIONER

## 2025-01-31 RX ORDER — OXYBUTYNIN CHLORIDE 5 MG/1
5 TABLET, EXTENDED RELEASE ORAL DAILY
Qty: 90 TABLET | Refills: 1 | Status: SHIPPED | OUTPATIENT
Start: 2025-01-31

## 2025-01-31 RX ORDER — DIPHENOXYLATE HYDROCHLORIDE AND ATROPINE SULFATE 2.5; .025 MG/1; MG/1
TABLET ORAL DAILY
COMMUNITY

## 2025-01-31 RX ORDER — ROSUVASTATIN CALCIUM 40 MG/1
40 TABLET, COATED ORAL NIGHTLY
Qty: 90 TABLET | Refills: 1 | Status: SHIPPED | OUTPATIENT
Start: 2025-01-31

## 2025-01-31 NOTE — PROGRESS NOTES
"Emily Garcia presents to [facility] with complaint of  Fatigue (Trouble staying awake. Falling asleep out of nowhere.)    SUBJECTIVE  History of Present Illness    Patient is here to discuss fatigue and increased drowsiness.  She was seen in the office 3 months ago by Noni Patterson for similar concern.  At that time, she was referred to sleep medicine [facility].  Patient has had multiple appointments and also had an appointment yesterday with sleep medicine but did not show for the appointment.  Patient says that she was not aware she had an appointment.    Patient was also on Zoloft for anxiety and depression, she had stopped taking the medication but has now resumed.  She is needing it refilled.    She had lipid panel checked in October that showed improvement.  Patient has been out of cholesterol medication for about a month.     She is also needing to triptan refilled.  She takes this for stress incontinence.    Patient also had vitamin D checked that was normal.  She is now taking over-the-counter vitamin D replacement/multivitamin.        OBJECTIVE  Vital Signs:   /68 (BP Location: Left arm, Patient Position: Sitting, Cuff Size: Large Adult)   Pulse 73   Temp 97.9 °F (36.6 °C) (Oral)   Ht 147.3 cm (58\")   Wt 96 kg (211 lb 9.6 oz)   SpO2 94%   BMI 44.22 kg/m²       Physical Exam  Vitals reviewed.   Constitutional:       General: She is not in acute distress.     Appearance: Normal appearance. She is morbidly obese. She is not ill-appearing.   HENT:      Head: Normocephalic and atraumatic.      Nose: Nose normal.      Mouth/Throat:      Mouth: Mucous membranes are moist.      Pharynx: Oropharynx is clear.   Cardiovascular:      Rate and Rhythm: Normal rate and regular rhythm.      Pulses: Normal pulses.      Heart sounds: Normal heart sounds.   Pulmonary:      Effort: Pulmonary effort is normal.      Breath sounds: Normal breath sounds.   Musculoskeletal:      " Cervical back: Neck supple.   Skin:     General: Skin is warm and dry.   Neurological:      General: No focal deficit present.      Mental Status: She is alert and oriented to person, place, and time. Mental status is at baseline.   Psychiatric:         Mood and Affect: Mood normal.         Behavior: Behavior normal.         Judgment: Judgment normal.          Results Review:  The following data was reviewed by DILCIA Lopez [unfilled] 13:33 EST.  No visits with results within 3 Month(s) from this visit.   Latest known visit with results is:   Lab on 10/24/2024   Component Date Value Ref Range Status    Hemoglobin A1C 10/24/2024 5.50  4.80 - 5.60 % Final    25 Hydroxy, Vitamin D 10/24/2024 31.8  30.0 - 100.0 ng/ml Final    TSH 10/24/2024 2.420  0.270 - 4.200 uIU/mL Final    Glucose 10/24/2024 107 (H)  65 - 99 mg/dL Final    BUN 10/24/2024 12  6 - 20 mg/dL Final    Creatinine 10/24/2024 0.85  0.57 - 1.00 mg/dL Final    Sodium 10/24/2024 140  136 - 145 mmol/L Final    Potassium 10/24/2024 3.8  3.5 - 5.2 mmol/L Final    Chloride 10/24/2024 103  98 - 107 mmol/L Final    CO2 10/24/2024 26.9  22.0 - 29.0 mmol/L Final    Calcium 10/24/2024 9.2  8.6 - 10.5 mg/dL Final    Total Protein 10/24/2024 7.7  6.0 - 8.5 g/dL Final    Albumin 10/24/2024 4.2  3.5 - 5.2 g/dL Final    ALT (SGPT) 10/24/2024 23  1 - 33 U/L Final    AST (SGOT) 10/24/2024 18  1 - 32 U/L Final    Alkaline Phosphatase 10/24/2024 75  39 - 117 U/L Final    Total Bilirubin 10/24/2024 0.4  0.0 - 1.2 mg/dL Final    Globulin 10/24/2024 3.5  gm/dL Final    A/G Ratio 10/24/2024 1.2  g/dL Final    BUN/Creatinine Ratio 10/24/2024 14.1  7.0 - 25.0 Final    Anion Gap 10/24/2024 10.1  5.0 - 15.0 mmol/L Final    eGFR 10/24/2024 89.5  >60.0 mL/min/1.73 Final    Vitamin B-12 10/24/2024 334  211 - 946 pg/mL Final    Monospot 10/24/2024 Negative  Negative Final     Office Visit with Noni Patterson APRN (10/24/2024)   ASSESSMENT AND PLAN:  Diagnoses and all orders for this  visit:    1. Anxiety and depression (Primary)  -     sertraline (ZOLOFT) 50 MG tablet; Take 1 tablet by mouth Daily.  Dispense: 90 tablet; Refill: 1    2. Urinary incontinence, unspecified type  -     oxybutynin XL (DITROPAN-XL) 5 MG 24 hr tablet; Take 1 tablet by mouth Daily.  Dispense: 90 tablet; Refill: 1    3. Vitamin D deficiency    4. Other fatigue    5. Snoring    6. PTSD (post-traumatic stress disorder)  -     sertraline (ZOLOFT) 50 MG tablet; Take 1 tablet by mouth Daily.  Dispense: 90 tablet; Refill: 1    7. Mixed hyperlipidemia  -     rosuvastatin (CRESTOR) 40 MG tablet; Take 1 tablet by mouth Every Night.  Dispense: 90 tablet; Refill: 1    8. Morbid obesity with BMI of 40.0-44.9, adult      Patient was given refill of Zoloft and Ditropan.  Also refilled her rosuvastatin.  Her cholesterol levels are still not at goal.  She will be due for cholesterol check when she returns to the office in 6 months.  She understands that if cholesterol levels are not improved, Zetia or changed to atorvastatin may be recommended.  Low-fat diet and regular exercise encouraged.    She was provided [facility] sleep medicine phone number to call and reschedule sleep study.  Continue vitamin D replacement.    Discussed with patient that I will be transferring to office in [place].  Patient would like to keep her care here, she was scheduled transfer care appointment with CRISTAL Crow for her 6 month follow up.           Follow Up   Return in about 6 months (around 7/31/2025). Patient to notify office with any acute concerns or issues.  Patient verbalizes understanding, agrees with plan of care and has no further questions upon discharge.     Patient was given instructions and counseling regarding her condition or for health maintenance advice. Please see specific information pulled into the AVS if appropriate.     Discussed the importance of following up with any needed screening tests/labs/specialist appointments and  any requested follow-up recommended by me today. Importance of maintaining follow-up discussed and patient accepts that missed appointments can delay diagnosis and potentially lead to worsening of conditions.    Part of this note may be an electronic transcription/translation of spoken language to printed text using the Dragon Dictation System.